# Patient Record
Sex: FEMALE | Race: BLACK OR AFRICAN AMERICAN | NOT HISPANIC OR LATINO | Employment: FULL TIME | URBAN - METROPOLITAN AREA
[De-identification: names, ages, dates, MRNs, and addresses within clinical notes are randomized per-mention and may not be internally consistent; named-entity substitution may affect disease eponyms.]

---

## 2019-12-10 PROBLEM — Z91.148 NON COMPLIANCE W MEDICATION REGIMEN: Status: ACTIVE | Noted: 2019-08-20

## 2019-12-10 PROBLEM — Z23 INFLUENZA VACCINE NEEDED: Status: ACTIVE | Noted: 2019-12-10

## 2019-12-10 PROBLEM — R13.19 ESOPHAGEAL DYSPHAGIA: Status: ACTIVE | Noted: 2019-08-14

## 2020-01-24 PROBLEM — M19.90 OSTEOARTHRITIS: Status: ACTIVE | Noted: 2020-01-24

## 2020-07-12 PROBLEM — I10 HTN (HYPERTENSION): Status: ACTIVE | Noted: 2020-07-12

## 2020-07-12 PROBLEM — E87.1 HYPONATREMIA: Status: ACTIVE | Noted: 2020-07-12

## 2020-07-12 PROBLEM — U07.1 COVID-19 VIRUS DETECTED: Status: ACTIVE | Noted: 2020-07-12

## 2020-07-14 PROBLEM — E87.1 HYPONATREMIA: Status: RESOLVED | Noted: 2020-07-12 | Resolved: 2020-07-14

## 2020-07-14 PROBLEM — J18.9 PNEUMONIA: Status: ACTIVE | Noted: 2020-07-14

## 2020-07-14 PROBLEM — J96.00 ACUTE RESPIRATORY FAILURE: Status: ACTIVE | Noted: 2020-07-14

## 2020-07-17 ENCOUNTER — TELEPHONE (OUTPATIENT)
Dept: ADMINISTRATIVE | Facility: CLINIC | Age: 60
End: 2020-07-17

## 2020-07-18 ENCOUNTER — NURSE TRIAGE (OUTPATIENT)
Dept: ADMINISTRATIVE | Facility: CLINIC | Age: 60
End: 2020-07-18

## 2020-07-18 NOTE — TELEPHONE ENCOUNTER
Called pt for enrollment outreach for Covid Surveillance program. Pt does wish to participate but tasks were removed from pt to do list since pt declined MyChart initially. Need order replaced for Surveillance. Also, pulse ox was sent to wrong pharmacy. Needs to be sent to [facility]. Pt already had [employer] oliver downloaded on her phone but was unable to log in. Gave pt tech support number ([phone]). Verified contacts and emergency contact info. Went over surveillance program with pt. Pt also asked about her insulin rx. Says she went to [facility] pharmacy and was told they do not have it even though there are 3 refills left. Will forward info to prescribing  Attempted to contact [facility] pharmacy but they were closed. Rx is for Tresiba Flextouch. Advised pt to follow up with [facility] again when they open just to verify. Pt had no other questions at this time. Gave OOC number if any further questions or concerns or if symptoms worsen.     Reason for Disposition   General information question, no triage required and triager able to answer question    Protocols used: INFORMATION ONLY CALL - NO TRIAGE-A-

## 2020-07-20 ENCOUNTER — NURSE TRIAGE (OUTPATIENT)
Dept: ADMINISTRATIVE | Facility: CLINIC | Age: 60
End: 2020-07-20

## 2020-07-20 ENCOUNTER — TELEPHONE (OUTPATIENT)
Dept: ADMINISTRATIVE | Facility: CLINIC | Age: 60
End: 2020-07-20

## 2020-07-20 NOTE — TELEPHONE ENCOUNTER
No contact made with pt through the Covid Home Surveillance program. AZAM currently working on getting program reordered for pt and having her pulse ox sent to the right pharmacy.  Per protocol, no additional triage or action needed at this time.    Additional Information   Negative: Caller has already spoken with the PCP (or office), and has no further questions   Negative: Caller has already spoken with another triager and has no further questions   Negative: Caller has already spoken with another triager or PCP (or office), and has further questions and triager able to answer questions.   Negative: Busy signal.  First attempt to contact caller.  Follow-up call scheduled within 15 minutes.   Negative: No answer.  First attempt to contact caller.  Follow-up call scheduled within 15 minutes.   Negative: Message left on identified voicemail   Negative: Message left on unidentified voice mail. Phone number verified.   Negative: Message left with person in household   Negative: Wrong number reached. Phone number verified.   Negative: Second attempt to contact family AND no contact made. Phone number verified.   Negative: Cell phone out of range. Phone number verified.   Negative: Patient already left for the hospital/clinic   Negative: Caller has cancelled the call before the first contact   Negative: Unable to complete triage due to phone connection issues    Protocols used: NO CONTACT OR DUPLICATE CONTACT CALL-A-[place]

## 2020-07-20 NOTE — PROGRESS NOTES
Pt needs program re-ordered now that portal status is 'pending' rather than declined. She also requested that her pulse ox be re-sent to the [place] pharmacy location. Sent at this time.

## 2020-07-20 NOTE — TELEPHONE ENCOUNTER
"spoke with pt:stayed 6 days in hospital and d/c Friday evening. Feels some better since Friday.     Dx last Monday with COVID 19 and pneumonia. Has had high heart since.       Just got pulse ox today.    02 96-97%.  breathing is the same as day of discharge. Last night glucose 179 .having heart burn- not new    Is hydrating with 48 ounces daily.   Gets dizzy and lightheaded with walking.     ,94% and that is post sitting down for 1 hour.     protocol advice given and pt verbalizes understanding.       Reason for Disposition   Patient wants to be seen    Additional Information   Negative: Passed out (i.e., fainted, collapsed and was not responding)   Negative: Shock suspected (e.g., cold/pale/clammy skin, too weak to stand, low BP, rapid pulse)   Negative: Difficult to awaken or acting confused (e.g., disoriented, slurred speech)   Negative: Visible sweat on face or sweat dripping down face   Negative: Unable to walk, or can only walk with assistance (e.g., requires support)   Negative: Received SHOCK from implantable cardiac defibrillator and has persisting symptoms (i.e., palpitations, lightheadedness)   Negative: Sounds like a life-threatening emergency to the triager   Negative: Chest pain   Negative: Heart beating very rapidly (e.g., > 140 / minute) and present now (EXCEPTION: during exercise)   Negative: Heart beating very slowly (e.g., < 50 / minute) (EXCEPTION: athlete)   Negative: Dizziness, lightheadedness, or weakness   Negative: New or worsened shortness of breath with activity (dyspnea on exertion)   Negative: Patient sounds very sick or weak to the triager   Negative: Wearing a "holter monitor" or "cardiac event monitor"   Negative: Received SHOCK from implantable cardiac defibrillator (and now feels well)   Negative: Heart beating very rapidly (e.g., > 140 / minute) and not present now (EXCEPTION: during exercise)   Negative: Skipped or extra beat(s) and increases with " exercise or exertion   Negative: Skipped or extra beat(s) and occurs 4 or more times per minute   Negative: History of heart disease (i.e., heart attack, bypass surgery, angina, angioplasty)   Negative: Age > 60 years   Negative: Taking water pill (i.e., diuretic) or heart medication (e.g., digoxin)    Protocols used: HEART RATE AND HEARTBEAT [id number]

## 2020-07-20 NOTE — TELEPHONE ENCOUNTER
Pt verified identity by spelling first and last name and verifying . Pt escalated to medium priority for no response to push message. COVID-19 assessment completed with positive findings of c/o periods of excessive or fast heartbeats and intermittent dry cough. Pt states that current pulse is 130 bpm after walking from kitchen and 125 bpm at rest and states that there's periods where her heart rate feels like it speeds up very quickly. Pt denies SOB at rest or w/activity at this time. SaO2 95% on RA; T 98.4 F. Pt denies chest pressure or tightness. Pt denies extremity swelling. Pt denies dizziness or loss of balance upon rising or ambulating. Care advice provided and pt advised that COVID-19 symptoms are mild and can be managed at home at this time, but her symptoms of tachycardia require seeing PCP now; pt voiced verbal understanding and agrees with advice. Advised pt that ZAAM on duty will be calling at home now; pt voiced verbal understanding and agrees with plan of care.  Instructed pt to consider self as infectious and to follow social distancing, vigorous handwashing, cover cough and sneezes, wipe down cell phone several times daily with an antibacterial wipe, and quarantine techniques; pt voiced verbal understanding and agrees with information. Instructed pt to call OOC back for further assistance if needed or if symptoms worsened and call 911 for all emergencies; pt voiced verbal understanding and agrees with goals. Spoke with AZAM via MS Teams Chat and states that she is speaking with pt now.    Reason for Disposition   [1] COVID-19 diagnosed by positive lab test AND [2] mild symptoms (e.g., cough, fever, others) AND [3] no complications or SOB   Heart beating very rapidly (e.g., > 140 / minute) and not present now (EXCEPTION: during exercise)    Additional Information   Negative: SEVERE difficulty breathing (e.g., struggling for each breath, speaks in single words)   Negative: Difficult to awaken or  acting confused (e.g., disoriented, slurred speech)   Negative: Bluish (or gray) lips or face now   Negative: Shock suspected (e.g., cold/pale/clammy skin, too weak to stand, low BP, rapid pulse)   Negative: Sounds like a life-threatening emergency to the triager   Negative: [1] COVID-19 exposure AND [2] NO symptoms   Negative: COVID-19 and Breastfeeding, questions about   Negative: [1] Adult with possible COVID-19 symptoms AND [2] triager concerned about severity of symptoms or other causes   Negative: SEVERE or constant chest pain or pressure (Exception: mild central chest pain, present only when coughing)   Negative: MODERATE difficulty breathing (e.g., speaks in phrases, SOB even at rest, pulse 100-120)   Negative: MILD difficulty breathing (e.g., minimal/no SOB at rest, SOB with walking, pulse <100)   Negative: Chest pain   Negative: Patient sounds very sick or weak to the triager   Negative: Fever > 103 F (39.4 C)   Negative: [1] Fever > 101 F (38.3 C) AND [2] age > 60   Negative: [1] Fever > 100.0 F (37.8 C) AND [2] bedridden (e.g., nursing home patient, CVA, chronic illness, recovering from surgery)   Negative: HIGH RISK patient (e.g., age > 64 years, diabetes, heart or lung disease, weak immune system)   Negative: [1] COVID-19 infection suspected by caller or triager AND [2] mild symptoms (cough, fever, or others) AND [3] no complications or SOB   Negative: Fever present > 3 days (72 hours)   Negative: [1] Fever returns after gone for over 24 hours AND [2] symptoms worse or not improved   Negative: [1] Continuous (nonstop) coughing interferes with work or school AND [2] no improvement using cough treatment per protocol   Negative: Cough present > 3 weeks   Negative: Passed out (i.e., fainted, collapsed and was not responding)   Negative: Shock suspected (e.g., cold/pale/clammy skin, too weak to stand, low BP, rapid pulse)   Negative: Difficult to awaken or acting confused (e.g.,  "disoriented, slurred speech)   Negative: Visible sweat on face or sweat dripping down face   Negative: Unable to walk, or can only walk with assistance (e.g., requires support)   Negative: Received SHOCK from implantable cardiac defibrillator and has persisting symptoms (i.e., palpitations, lightheadedness)   Negative: Sounds like a life-threatening emergency to the triager   Negative: Chest pain   Negative: Difficulty breathing   Negative: Dizziness, lightheadedness, or weakness   Negative: Heart beating very rapidly (e.g., > 140 / minute) and present now (EXCEPTION: during exercise)   Negative: Heart beating very slowly (e.g., < 50 / minute) (EXCEPTION: athlete)   Negative: New or worsened shortness of breath with activity (dyspnea on exertion)   Negative: Patient sounds very sick or weak to the triager   Negative: Wearing a "holter monitor" or "cardiac event monitor"   Negative: Received SHOCK from implantable cardiac defibrillator (and now feels well)    Protocols used: CORONAVIRUS (COVID-19) DIAGNOSED OR [id number], HEART RATE AND HEARTBEAT [id number]      "

## 2020-07-20 NOTE — TELEPHONE ENCOUNTER
COVID Surveillance Program: AZAM Telephone Note    Called patient due to RN escalation in COVID Surveillance program. Pt escalated due to elevated HR (123).     Patient location:     Vitals: Sp02 : 95% on RA. P: 120. Temp: 98.4    59 y.o. F with pertinent PMHx HTN, DM, HLD on day10 of Covid symptoms. Positive Covid screen 7.12.20. CXR on 7.12.20 - Ill-defined scattered opacities of the mid and lower lungs  are greater in number on the right, suggestive of viral  pneumonitis given above clinical history.   . Home oxygen: no. COVID-19 Hospitalization History: 5 days from 7.12.20-7.17.20    ROS: Denies worsening cough, light headedness, dizziness, fever, chills, diaphoresis, chest pain, abdominal pain, emesis, diarrhea or further symptoms. No SOB.     HPI: Patient's HR is elevated at 123. In reviewing her flowsheet, her HR was 102-138 throughout her hospitalization. While talking on phone, HR maintained at 120. She had an EKG on 7.12.20 that showed sinus tachycardia ().    Assessment: During phone call, patient appears alert and oriented. Able to speak in full sentences without difficulty. No audible wheezing heard during call. Denies chest pain or pressure, SOB, dizziness, lightheadedness, or any other symptoms     Plan: Has a PCP (Mattie Lu). Has not followed up with PCP since hospital discharged. She is not scheduled to see them until 8/3/20. She needs a repeat COVID test before she goes back to work.    Discussed with patient that she needs eval for tachycardia but her HR is within range of what was experienced during hospitalization, so she does not need to seek emergent care at this time. If pt develops chest pain, SOB, dizziness, or lightheadedness, she needs to seek emergent care. Otherwise, asked pt to call PCP tomorrow to follow up.    Reviewed with patient the reasons for seeking emergency care. Pt aware that if Sp02 <90%, they need to go to the ED. If any new or worsening symptoms arise, go to ED  or Urgent. Otherwise, patient will continue to submit data as scheduled. Reviewed importance of wearing mask if self or family members leave the house.

## 2020-07-21 ENCOUNTER — NURSE TRIAGE (OUTPATIENT)
Dept: ADMINISTRATIVE | Facility: CLINIC | Age: 60
End: 2020-07-21

## 2020-07-21 NOTE — TELEPHONE ENCOUNTER
COVID SURVEILLANCE   Called pt for no response to afternoon push notification and escation to que.  Patient in ED     Reason for Disposition   Already left for the hospital/clinic.    Protocols used: ST NO CONTACT OR DUPLICATE CONTACT CALL-A-AH

## 2020-07-22 ENCOUNTER — NURSE TRIAGE (OUTPATIENT)
Dept: ADMINISTRATIVE | Facility: CLINIC | Age: 60
End: 2020-07-22

## 2020-07-22 NOTE — TELEPHONE ENCOUNTER
Covid surveillance program     Pt escalated as high priority escalation for HR Was instructed to repeat HR by oliver- Readings 128 and 125. Oxygen sat 95-98% Pt states she is sitting outside. Pt reports moderate CP but states she feels like that may be related to GERD. States she had an EKG yesterday and was told it looked OK. While on phone with pt having her sit HR ranged . Advised pt to call EMS for CP and irregular HR. Also spoke with daughter. Both verbalized understanding.     Reason for Disposition   Chest pain lasting longer than 5 minutes and ANY of the following:* Over 50 years old* Over 30 years old and at least one cardiac risk factor (i.e., high blood pressure, diabetes, high cholesterol, obesity, smoker or strong family history of heart disease)* Pain is crushing, pressure-like, or heavy * Took nitroglycerin and chest pain was not relieved* History of heart disease (i.e., angina, heart attack, bypass surgery, angioplasty, CHF)    Additional Information   Negative: Severe difficulty breathing (e.g., struggling for each breath, speaks in single words)   Negative: Passed out (i.e., fainted, collapsed and was not responding)    Protocols used: CHEST PAIN-A-OH

## 2020-07-25 ENCOUNTER — NURSE TRIAGE (OUTPATIENT)
Dept: ADMINISTRATIVE | Facility: CLINIC | Age: 60
End: 2020-07-25

## 2020-07-25 NOTE — TELEPHONE ENCOUNTER
COVID SURVEILLANCE    SpO2 98  T 97.8    Pt escalated due to no response to 3 pm push notification. Pt entered VS at 357 pm. All VS stable as listed above. Pt denies worsening cough, fever symptoms, or SOB. No contact made at this time. Will continue to monitor at next push notification.      Reason for Disposition   Caller has cancelled the call before the first contact.    Protocols used: ST NO CONTACT OR DUPLICATE CONTACT CALL-A-AH

## 2020-07-26 ENCOUNTER — NURSE TRIAGE (OUTPATIENT)
Dept: ADMINISTRATIVE | Facility: CLINIC | Age: 60
End: 2020-07-26

## 2020-07-26 NOTE — TELEPHONE ENCOUNTER
COVID SURVEILLANCE    SpO2 97  T 97.3    Pt escalated due to no response to 3 pm push notification. Contacted pt who entered VS while on the phone. All VS stable as listed above. Pt denies worsening cough, fever symptoms , or SOB. Pt has OOC number if symptoms worsen. Will continue to monitor at next push notification.

## 2020-07-29 ENCOUNTER — NURSE TRIAGE (OUTPATIENT)
Dept: ADMINISTRATIVE | Facility: CLINIC | Age: 60
End: 2020-07-29

## 2020-07-29 NOTE — TELEPHONE ENCOUNTER
Patient initially escalated due to no response, however patient entered vitals prior to phone call. Vital signs and symptoms did not trigger a second escalation; no follow up call is needed at this time.     Reason for Disposition   Health Information question, no triage required and triager able to answer question    Additional Information   Negative: [1] Caller is not with the adult (patient) AND [2] reporting urgent symptoms   Negative: Lab result questions   Negative: Medication questions   Negative: Caller can't be reached by phone   Negative: Caller has already spoken to PCP or another triager   Negative: RN needs further essential information from caller in order to complete triage   Negative: Requesting regular office appointment   Negative: [1] Caller requesting NON-URGENT health information AND [2] PCP's office is the best resource    Protocols used: INFORMATION ONLY CALL - NO TRIAGE-A-

## 2020-08-02 ENCOUNTER — NURSE TRIAGE (OUTPATIENT)
Dept: ADMINISTRATIVE | Facility: CLINIC | Age: 60
End: 2020-08-02

## 2020-08-24 PROBLEM — R00.0 TACHYCARDIA: Status: ACTIVE | Noted: 2020-08-24

## 2020-08-24 PROBLEM — D50.9 IRON DEFICIENCY ANEMIA: Status: ACTIVE | Noted: 2020-08-24

## 2020-08-24 PROBLEM — U07.1 COVID-19 VIRUS DETECTED: Status: RESOLVED | Noted: 2020-07-12 | Resolved: 2020-08-24

## 2020-08-24 PROBLEM — Q39.4 ESOPHAGEAL WEB: Status: ACTIVE | Noted: 2020-08-24

## 2020-10-13 PROBLEM — J18.9 PNEUMONIA: Status: RESOLVED | Noted: 2020-07-14 | Resolved: 2020-10-13

## 2020-10-13 PROBLEM — J96.00 ACUTE RESPIRATORY FAILURE: Status: RESOLVED | Noted: 2020-07-14 | Resolved: 2020-10-13

## 2021-02-22 PROBLEM — S82.831A CLOSED FRACTURE OF RIGHT DISTAL FIBULA: Status: ACTIVE | Noted: 2021-02-22

## 2021-02-22 PROBLEM — M25.571 ACUTE RIGHT ANKLE PAIN: Status: ACTIVE | Noted: 2021-02-22
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
## 2021-04-28 DIAGNOSIS — E11.9 TYPE 2 DIABETES MELLITUS WITHOUT COMPLICATION: ICD-10-CM

## 2021-06-15 PROBLEM — S92.354A CLOSED NONDISPLACED FRACTURE OF FIFTH RIGHT METATARSAL BONE: Status: ACTIVE | Noted: 2021-06-15

## 2021-06-15 PROBLEM — M79.671 ACUTE PAIN OF RIGHT FOOT: Status: ACTIVE | Noted: 2021-06-15

## 2021-07-19 PROBLEM — M79.602 BILATERAL ARM PAIN: Status: RESOLVED | Noted: 2019-08-05 | Resolved: 2021-07-19

## 2021-07-19 PROBLEM — R87.615 UNSATISFACTORY CERVICAL PAPANICOLAOU SMEAR: Status: RESOLVED | Noted: 2018-07-10 | Resolved: 2021-07-19

## 2021-07-19 PROBLEM — Z12.39 SCREENING FOR BREAST CANCER: Status: ACTIVE | Noted: 2019-11-19

## 2021-07-19 PROBLEM — Z98.42 STATUS POST LEFT CATARACT EXTRACTION: Status: ACTIVE | Noted: 2019-10-01

## 2021-07-19 PROBLEM — M80.00XD AGE-RELATED OSTEOPOROSIS WITH CURRENT PATHOLOGICAL FRACTURE WITH ROUTINE HEALING: Chronic | Status: ACTIVE | Noted: 2021-07-19

## 2021-07-19 PROBLEM — Q39.4 ESOPHAGEAL WEB: Status: RESOLVED | Noted: 2020-08-24 | Resolved: 2021-07-19

## 2021-07-19 PROBLEM — H40.1131 PRIMARY OPEN ANGLE GLAUCOMA OF BOTH EYES, MILD STAGE: Status: ACTIVE | Noted: 2019-01-28

## 2021-07-19 PROBLEM — Z12.39 SCREENING FOR BREAST CANCER: Status: RESOLVED | Noted: 2019-11-19 | Resolved: 2021-07-19

## 2021-07-19 PROBLEM — R87.615 UNSATISFACTORY CERVICAL PAPANICOLAOU SMEAR: Status: ACTIVE | Noted: 2018-07-10

## 2021-07-19 PROBLEM — D50.9 IRON DEFICIENCY ANEMIA: Status: RESOLVED | Noted: 2020-08-24 | Resolved: 2021-07-19

## 2021-07-19 PROBLEM — R07.9 CHEST PAIN: Status: ACTIVE | Noted: 2019-08-05

## 2021-07-19 PROBLEM — M79.601 BILATERAL ARM PAIN: Status: RESOLVED | Noted: 2019-08-05 | Resolved: 2021-07-19

## 2021-07-19 PROBLEM — R06.09 DOE (DYSPNEA ON EXERTION): Status: ACTIVE | Noted: 2019-08-05

## 2021-07-19 PROBLEM — Z78.9 INEFFECTIVE SELF HEALTH MANAGEMENT: Status: ACTIVE | Noted: 2019-07-05

## 2021-07-19 PROBLEM — M79.602 BILATERAL ARM PAIN: Status: ACTIVE | Noted: 2019-08-05

## 2021-07-19 PROBLEM — M79.601 BILATERAL ARM PAIN: Status: ACTIVE | Noted: 2019-08-05

## 2021-07-19 PROBLEM — Z98.42 STATUS POST LEFT CATARACT EXTRACTION: Status: RESOLVED | Noted: 2019-10-01 | Resolved: 2021-07-19

## 2021-07-19 PROBLEM — Z23 INFLUENZA VACCINE NEEDED: Status: RESOLVED | Noted: 2019-12-10 | Resolved: 2021-07-19

## 2021-08-19 PROBLEM — R13.10 DYSPHAGIA: Status: ACTIVE | Noted: 2019-08-14

## 2022-06-18 PROBLEM — K52.9 GASTROENTERITIS: Status: ACTIVE | Noted: 2022-06-18

## 2022-06-18 PROBLEM — R11.2 NAUSEA AND VOMITING: Status: ACTIVE | Noted: 2022-06-18

## 2022-06-18 PROBLEM — R19.7 DIARRHEA: Status: ACTIVE | Noted: 2022-06-18

## 2022-07-07 PROBLEM — M25.531 RIGHT WRIST PAIN: Status: ACTIVE | Noted: 2022-07-07

## 2022-11-22 ENCOUNTER — PATIENT OUTREACH (OUTPATIENT)
Dept: ADMINISTRATIVE | Facility: HOSPITAL | Age: 62
End: 2022-11-22

## 2022-11-22 DIAGNOSIS — E11.65 POORLY CONTROLLED TYPE 2 DIABETES MELLITUS WITH NEUROPATHY: Primary | ICD-10-CM

## 2022-11-22 DIAGNOSIS — E11.40 POORLY CONTROLLED TYPE 2 DIABETES MELLITUS WITH NEUROPATHY: Primary | ICD-10-CM

## 2023-04-10 PROBLEM — M79.675 PAIN OF TOE OF LEFT FOOT: Status: ACTIVE | Noted: 2023-04-10

## 2023-04-10 PROBLEM — S92.415A CLOSED NONDISPLACED FRACTURE OF PROXIMAL PHALANX OF LEFT GREAT TOE: Status: ACTIVE | Noted: 2023-04-10

## 2023-06-01 PROBLEM — K08.9 EXTRACTION OF TOOTH NEEDED: Status: ACTIVE | Noted: 2023-06-01

## 2023-06-21 PROBLEM — K02.9 CARIES: Status: ACTIVE | Noted: 2023-06-21

## 2024-01-12 PROBLEM — K02.9 CARIES: Status: RESOLVED | Noted: 2023-06-21 | Resolved: 2024-01-12

## 2024-01-12 PROBLEM — R06.09 DOE (DYSPNEA ON EXERTION): Status: RESOLVED | Noted: 2019-08-05 | Resolved: 2024-01-12

## 2024-01-12 PROBLEM — R07.9 CHEST PAIN: Status: RESOLVED | Noted: 2019-08-05 | Resolved: 2024-01-12

## 2024-01-12 PROBLEM — K08.9 EXTRACTION OF TOOTH NEEDED: Status: RESOLVED | Noted: 2023-06-01 | Resolved: 2024-01-12

## 2024-01-12 PROBLEM — R11.2 NAUSEA AND VOMITING: Status: RESOLVED | Noted: 2022-06-18 | Resolved: 2024-01-12

## 2024-01-12 PROBLEM — Z78.9 INEFFECTIVE SELF HEALTH MANAGEMENT: Status: RESOLVED | Noted: 2019-07-05 | Resolved: 2024-01-12

## 2024-01-12 PROBLEM — M79.671 RIGHT FOOT PAIN: Status: RESOLVED | Noted: 2021-06-15 | Resolved: 2024-01-12

## 2024-01-12 PROBLEM — M25.531 RIGHT WRIST PAIN: Status: RESOLVED | Noted: 2022-07-07 | Resolved: 2024-01-12

## 2024-01-12 PROBLEM — R00.0 TACHYCARDIA: Status: RESOLVED | Noted: 2020-08-24 | Resolved: 2024-01-12

## 2024-01-12 PROBLEM — Z91.148 NON COMPLIANCE W MEDICATION REGIMEN: Status: RESOLVED | Noted: 2019-08-20 | Resolved: 2024-01-12

## 2024-01-12 PROBLEM — R19.7 DIARRHEA: Status: RESOLVED | Noted: 2022-06-18 | Resolved: 2024-01-12
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
## 2024-01-12 PROBLEM — K52.9 GASTROENTERITIS: Status: RESOLVED | Noted: 2022-06-18 | Resolved: 2024-01-12

## 2024-01-31 PROBLEM — K31.84 GASTROPARESIS: Status: ACTIVE | Noted: 2024-01-31

## 2024-03-07 PROBLEM — R68.81 EARLY SATIETY: Status: ACTIVE | Noted: 2024-03-07

## 2024-03-07 PROBLEM — K57.30 DIVERTICULOSIS OF LARGE INTESTINE WITHOUT DIVERTICULITIS: Status: ACTIVE | Noted: 2024-03-07

## 2024-03-07 PROBLEM — Z87.898 HISTORY OF NAUSEA AND VOMITING: Status: ACTIVE | Noted: 2024-03-07

## 2024-08-09 PROBLEM — M20.41 ACQUIRED HAMMER TOES OF BOTH FEET: Status: ACTIVE | Noted: 2023-11-15

## 2024-08-09 PROBLEM — R13.10 DYSPHAGIA: Status: RESOLVED | Noted: 2019-08-14 | Resolved: 2024-08-09

## 2024-08-09 PROBLEM — R11.2 INTRACTABLE NAUSEA AND VOMITING: Status: RESOLVED | Noted: 2022-06-18 | Resolved: 2024-08-09

## 2024-08-09 PROBLEM — Z87.898 HISTORY OF NAUSEA AND VOMITING: Status: RESOLVED | Noted: 2024-03-07 | Resolved: 2024-08-09

## 2024-08-09 PROBLEM — R68.81 EARLY SATIETY: Status: RESOLVED | Noted: 2024-03-07 | Resolved: 2024-08-09

## 2024-08-09 PROBLEM — M20.42 ACQUIRED HAMMER TOES OF BOTH FEET: Status: ACTIVE | Noted: 2023-11-15

## 2024-09-19 ENCOUNTER — PATIENT OUTREACH (OUTPATIENT)
Dept: ADMINISTRATIVE | Facility: OTHER | Age: 64
End: 2024-09-19

## 2024-09-19 NOTE — PROGRESS NOTES
CHW - Initial Contact    This Community Health Worker completed the Social Determinant of Health questionnaire with patient during clinic visit today.    Pt identified barriers of most importance are: patient identified no barriers of importance during clinic visit   Patient has some stress barriers that are personal issues.    Referrals to community agencies completed with patient consent outside of [facility] include: No community referral needed during clinic visit   Referrals were put through [facility] - no:   Support and Services: No support services needed during clinic visit   Other information discussed the patient needs help with:  patient discussed no other information during clinic visit   Follow up required: yes  Follow-up Outreach - Due: 10/17/2024

## 2024-11-08 ENCOUNTER — PATIENT OUTREACH (OUTPATIENT)
Dept: ADMINISTRATIVE | Facility: OTHER | Age: 64
End: 2024-11-08

## 2024-11-08 NOTE — PROGRESS NOTES
CHW - Follow Up    This Community Health Worker completed a follow up visit with patient during clinic visit today.  Pt/Caregiver reported: patient reported she is doing good no assistance is needed during follow up   Clinic visit. Patient will reach out if assistance is needed in there future.   Community Health Worker provided: patient reported she is doing good   Follow up required: No  No future outreach task assigned                 CHW - Case Closure    This Community Health Worker spoke to patient during clinic visit today.   Pt/Caregiver reported:  patient reported she is doing good no assistance is needed during follow up   Clinic visit. Patient will reach out if assistance is needed in there future.   Pt denied any additional needs at this time and agrees with episode closure at this time.    Provided patient with Community Health Worker's contact information and encouraged   her to contact this Community Health Worker if additional needs arise.